# Patient Record
Sex: MALE | Race: WHITE
[De-identification: names, ages, dates, MRNs, and addresses within clinical notes are randomized per-mention and may not be internally consistent; named-entity substitution may affect disease eponyms.]

---

## 2019-06-04 ENCOUNTER — HOSPITAL ENCOUNTER (OUTPATIENT)
Dept: HOSPITAL 92 - SDC | Age: 64
Discharge: HOME | End: 2019-06-04
Attending: SURGERY
Payer: MEDICARE

## 2019-06-04 VITALS — BODY MASS INDEX: 20.9 KG/M2

## 2019-06-04 DIAGNOSIS — F17.210: ICD-10-CM

## 2019-06-04 DIAGNOSIS — I48.91: ICD-10-CM

## 2019-06-04 DIAGNOSIS — Z79.82: ICD-10-CM

## 2019-06-04 DIAGNOSIS — I12.0: Primary | ICD-10-CM

## 2019-06-04 DIAGNOSIS — T82.41XA: ICD-10-CM

## 2019-06-04 DIAGNOSIS — Z79.01: ICD-10-CM

## 2019-06-04 DIAGNOSIS — Z79.899: ICD-10-CM

## 2019-06-04 DIAGNOSIS — N18.6: ICD-10-CM

## 2019-06-04 DIAGNOSIS — J44.9: ICD-10-CM

## 2019-06-04 DIAGNOSIS — I69.354: ICD-10-CM

## 2019-06-04 LAB
ANION GAP SERPL CALC-SCNC: 16 MMOL/L (ref 10–20)
BASOPHILS # BLD AUTO: 0 THOU/UL (ref 0–0.2)
BASOPHILS NFR BLD AUTO: 0.4 % (ref 0–1)
BUN SERPL-MCNC: 31 MG/DL (ref 8.4–25.7)
CALCIUM SERPL-MCNC: 9.7 MG/DL (ref 7.8–10.44)
CHLORIDE SERPL-SCNC: 101 MMOL/L (ref 98–107)
CO2 SERPL-SCNC: 27 MMOL/L (ref 23–31)
CREAT CL PREDICTED SERPL C-G-VRATE: 13 ML/MIN (ref 70–130)
EOSINOPHIL # BLD AUTO: 0.2 THOU/UL (ref 0–0.7)
EOSINOPHIL NFR BLD AUTO: 2.7 % (ref 0–10)
GLUCOSE SERPL-MCNC: 89 MG/DL (ref 80–115)
HGB BLD-MCNC: 10.8 G/DL (ref 14–18)
LYMPHOCYTES # BLD: 1.3 THOU/UL (ref 1.2–3.4)
LYMPHOCYTES NFR BLD AUTO: 18.7 % (ref 21–51)
MCH RBC QN AUTO: 33.8 PG (ref 27–31)
MCV RBC AUTO: 97.1 FL (ref 78–98)
MONOCYTES # BLD AUTO: 0.6 THOU/UL (ref 0.11–0.59)
MONOCYTES NFR BLD AUTO: 8.1 % (ref 0–10)
NEUTROPHILS # BLD AUTO: 4.9 THOU/UL (ref 1.4–6.5)
NEUTROPHILS NFR BLD AUTO: 70.1 % (ref 42–75)
PLATELET # BLD AUTO: 253 THOU/UL (ref 130–400)
POTASSIUM SERPL-SCNC: 4.6 MMOL/L (ref 3.5–5.1)
RBC # BLD AUTO: 3.18 MILL/UL (ref 4.7–6.1)
SODIUM SERPL-SCNC: 139 MMOL/L (ref 136–145)
WBC # BLD AUTO: 7 THOU/UL (ref 4.8–10.8)

## 2019-06-04 PROCEDURE — 031C0ZF BYPASS LEFT RADIAL ARTERY TO LOWER ARM VEIN, OPEN APPROACH: ICD-10-PCS | Performed by: SURGERY

## 2019-06-04 PROCEDURE — 0JH63XZ INSERTION OF TUNNELED VASCULAR ACCESS DEVICE INTO CHEST SUBCUTANEOUS TISSUE AND FASCIA, PERCUTANEOUS APPROACH: ICD-10-PCS | Performed by: SURGERY

## 2019-06-04 PROCEDURE — 71045 X-RAY EXAM CHEST 1 VIEW: CPT

## 2019-06-04 PROCEDURE — 0JPT3XZ REMOVAL OF TUNNELED VASCULAR ACCESS DEVICE FROM TRUNK SUBCUTANEOUS TISSUE AND FASCIA, PERCUTANEOUS APPROACH: ICD-10-PCS | Performed by: SURGERY

## 2019-06-04 PROCEDURE — 93005 ELECTROCARDIOGRAM TRACING: CPT

## 2019-06-04 PROCEDURE — 85025 COMPLETE CBC W/AUTO DIFF WBC: CPT

## 2019-06-04 PROCEDURE — C1769 GUIDE WIRE: HCPCS

## 2019-06-04 PROCEDURE — 80048 BASIC METABOLIC PNL TOTAL CA: CPT

## 2019-06-04 PROCEDURE — S0028 INJECTION, FAMOTIDINE, 20 MG: HCPCS

## 2019-06-04 PROCEDURE — 93010 ELECTROCARDIOGRAM REPORT: CPT

## 2019-06-04 PROCEDURE — C1752 CATH,HEMODIALYSIS,SHORT-TERM: HCPCS

## 2019-06-04 NOTE — RAD
AP view chest.



HISTORY: Hemodialysis catheter insertion.



AP view chest is obtained. There is a right jugular dialysis catheter distal tip overlying the SVC ri
ght atrial junction.



Mild cardiomegaly seen.



No evidence of effusions, pneumonia or pneumothorax seen.



IMPRESSION: mild cardiomegaly. No evidence of postprocedure hematoma or pneumothorax seen.



Reported By: Alexandro Jiménez 

Electronically Signed:  6/4/2019 4:22 PM

## 2019-06-04 NOTE — HP
HISTORY OF PRESENT ILLNESS:  Noel Morgan is a 64-year-old male patient, who had

a hemodialysis catheter placed at another facility in the past.  He has been

referred by Dr. Altamirano for permanent dialysis access.  He has seen Dr. Drake

who has performed a contrast venogram, suggesting the veins in his right arm are

superior, but on clinical exam, the left antecubital vein is superior.  The patient

has had a previous stroke.  He is nonambulatory, and presents today in the office.

He has a left hemiparesis.  He has completely paralyzed left upper extremity and

partially paralyzed left lower extremity.  He states he is able to transfer somewhat

with a brace on his lower extremities.  The patient dialyzes on Monday, Wednesday,

and Friday at [facility] on [facility] in [place] at 0900.  He is followed by Dr. Altamirano.  The patient lives with his daughter in [place].  The patient's daughter or

son-in-law takes him to dialysis and home. 



ALLERGIES:  NONE.



TOBACCO:  Less than a pack a day.



ALCOHOL:  None.



MEDICATIONS:  

1. Eliquis.

2. Atorvastatin.

3. Aspirin.

4. Baclofen.

5. Meclizine.

6. Metoprolol.

7. Protonix.

8. Vitamins.



ALLERGIES:  NONE.



PAST SURGICAL HISTORY:  Hemodialysis catheter.  He has never had a colonoscopy.  The

patient has worked in the past as a . 



REVIEW OF SYSTEMS:  Ten-point noncontributory otherwise.



PHYSICAL EXAMINATION:

VITAL SIGNS:  Weight 145 pounds, height 5 feet and 11 inches.  Blood pressure

132/84, pulse 81, temperature 99.3 degrees. 

GENERAL:  He is in a wheelchair.  His left upper extremity was paralyzed.  He has

weakness in his left lower extremity. 

LUNGS:  Clear to auscultation. 

CARDIAC:  Regular rate and rhythm without murmur or gallop. 

ABDOMEN:  Soft and nontender. 

EXTREMITIES:  As noted above.  Palpable radial pulses bilaterally.  Paralyzed left

upper extremity.  Antecubital vein right nonvisible.  Good size antecubital vein

left. 



ASSESSMENT AND PLAN:  

1. End-stage renal disease.  We will plan placement of a left arm fistula or graft.

He understands risks, benefits, and consents.  He will hold his Eliquis for 3 days

prior to the operation. 

2. Prior stroke in September 2018, left hemiparesis, nonambulatory.

3. Hypertension.

4. Chronic anticoagulation.

5. Chronic obstructive pulmonary disease.

6. Ongoing tobacco abuse.

7. History of atrial fibrillation, on anticoagulation.







Job ID:  [id number]

## 2019-06-04 NOTE — OP
DATE OF PROCEDURE:  06/04/2019



PREOPERATIVE DIAGNOSES:  

1. End-stage renal disease.

2. Dysfunctional right internal jugular hemodialysis catheter.

3. Previous stroke with left hemiparesis.



POSTOPERATIVE DIAGNOSES:  

1. End-stage renal disease.

2. Dysfunctional right internal jugular hemodialysis catheter.

3. Previous stroke with left hemiparesis.



PROCEDURE PERFORMED:  Removal of old hemodialysis catheter, placement of new

hemodialysis catheter, fluoroscopy used.  Left arm primary fistula, proximal radial

artery inflow, outflow, perforating branch antecubital vein without communication to

the basilic vein noted, retrograde antecubital vein preserved 3.5 mm coronary

dilators. 



ANESTHESIA:  General, local with 0.5% Marcaine with epinephrine 30 mL mixed with 2%

Xylocaine 10 mL. 



DESCRIPTION OF PROCEDURE:  The patient was taken to the operating room, where under

general anesthesia (severe COPD, previous stroke), neck and chest, left upper

extremity, axilla, chest prepared with ChloraPrep and draped in routine fashion.

Local anesthetic was infiltrated in the skin and subcutaneous tissue about the

operative site.  An incision was made over the right side of the neck and carried

down through the skin, subcutaneous tissue, platysma, and then hemodialysis catheter

dissected free, clamped, divided, J-wire threaded, dialysis catheter removed and new

exit site selected.  A stab incision was made over the right chest and using a

tunneling device, the pre-curved [employer] cuffed-tunneled hemodialysis tunneled

between the 2 incisions, placed the fabric cuff beneath the skin exit site and

catheter was secured with 2 interrupted sutures of 3-0 nylon.  Sterile dressing

applied.  Over the J-wire, dilator and Peel-Away sheath placed in the superior vena

cava.  Dilator and J-wire were removed.  Catheter was placed with the Peel-Away

sheath.  Peel-Away sheath was removed.  Platysma was approximated with 4-0 Monocryl,

skin with subdermal 4-0 Monocryl and Derma glue applied.  Each port aspirated blood,

flushed with saline solution and heparinized saline solution 1000 units heparin per

mL indicating volume of the port.  Dermabond and sterile dressings were applied.

Fluoroscopic images revealed good catheter placement. 



Incision was made in the proximal volar forearm longitudinally below the antecubital

fossa carried down through the skin and subcutaneous tissue.  Antecubital vein was

dissected free.  Perforating branch dissected free.  The patient was given 6000

units of heparin intravenously.  Proximal radial artery dissected free.  Brachial

ulnar artery identified.  Perforating branch dissected free and branches were

divided between clips and 4-0 silk ties and spatulated over branch point,

interrogated with coronary artery dilators, passing coronary artery dilators from a

2 mm to 3.5 mm coronary dye at the cephalic vein outflow tract upper arm without

obstruction.  It was flushed with heparinized saline solution.  A bulldog clamp

atraumatic applied.  Proximal aorta was clamped proximally and distally.

Longitudinal arteriotomy was made sharply, elongated with Cooper scissors and the

perforating branch antecubital vein was spatulated and end vein to side proximal

radial anastomosis created with continuous suture of 6-0 Prolene, completing the

anastomosis, ligating branches with 4-0 silk ties.  We released an arterial inflow,

noting a good Doppler signal in the cephalic vein outflow.  Good hemostasis was

noted.  The patient was given 25 mg intravenously by Anesthesia.  Subcutaneous

tissue was approximated with 3-0 Monocryl, skin with subdermal 4-0 Monocryl, and

Derma glue applied.  The patient tolerated the procedure well. 







Job ID:  [id number]

## 2019-06-05 NOTE — EKG
Test Reason : PREOP

Blood Pressure : ***/*** mmHG

Vent. Rate : 072 BPM     Atrial Rate : 072 BPM

   P-R Int : 178 ms          QRS Dur : 092 ms

    QT Int : 410 ms       P-R-T Axes : 005 012 022 degrees

   QTc Int : 448 ms

 

Normal sinus rhythm

Normal ECG

No previous ECGs available

Confirmed by VINOD STORM, DR. CHAVEZ ([id number]) on 6/5/2019 8:24:24 PM

 

Referred By:  NESTOR           Confirmed By:DR. SCOTT BROCK MD

## 2019-08-13 ENCOUNTER — HOSPITAL ENCOUNTER (OUTPATIENT)
Dept: HOSPITAL 92 - SPEC | Age: 64
Discharge: HOME | End: 2019-08-13
Attending: SURGERY
Payer: MEDICARE

## 2019-08-13 VITALS — BODY MASS INDEX: 20.2 KG/M2

## 2019-08-13 DIAGNOSIS — I12.0: ICD-10-CM

## 2019-08-13 DIAGNOSIS — F41.9: ICD-10-CM

## 2019-08-13 DIAGNOSIS — T82.858A: Primary | ICD-10-CM

## 2019-08-13 DIAGNOSIS — Z79.899: ICD-10-CM

## 2019-08-13 DIAGNOSIS — Z79.01: ICD-10-CM

## 2019-08-13 DIAGNOSIS — Z79.82: ICD-10-CM

## 2019-08-13 DIAGNOSIS — K21.9: ICD-10-CM

## 2019-08-13 DIAGNOSIS — F17.200: ICD-10-CM

## 2019-08-13 DIAGNOSIS — J44.9: ICD-10-CM

## 2019-08-13 DIAGNOSIS — I69.352: ICD-10-CM

## 2019-08-13 DIAGNOSIS — E78.5: ICD-10-CM

## 2019-08-13 DIAGNOSIS — N18.6: ICD-10-CM

## 2019-08-13 DIAGNOSIS — Z99.2: ICD-10-CM

## 2019-08-13 PROCEDURE — 057F3Z1 DILATION OF LEFT CEPHALIC VEIN USING DRUG-COATED BALLOON, PERCUTANEOUS APPROACH: ICD-10-PCS | Performed by: RADIOLOGY

## 2019-08-13 PROCEDURE — C1725 CATH, TRANSLUMIN NON-LASER: HCPCS

## 2019-08-13 PROCEDURE — 36902 INTRO CATH DIALYSIS CIRCUIT: CPT

## 2019-08-13 PROCEDURE — 36901 INTRO CATH DIALYSIS CIRCUIT: CPT

## 2019-08-13 NOTE — SPC
EXAM:

[facility] [place] INTRO CATH DIALY CIRC/AV S



PROVIDED CLINICAL HISTORY:

Nonmaturing left upper extremity arteriovenous dialysis fistula in a patient with end-stage renal dis
ease.



COMPARISON:

None



TECHNIQUE:

The procedure including the risks and complications were explained to the patient, and informed conse
nt was obtained. Patient was placed on the angiography table in the supine position. The left upper

extremity was meticulously prepped and draped in usual sterile fashion.



Limited ultrasound examination of the left upper extremity arteriovenous fistulogram was performed, a
nd the fistula was accessed directed in the venous direction after the skin and subcutaneous

tissues were infiltrated with buffered 1% lidocaine for local anesthesia. A 4 Faroese introducer sheat
h was placed. A fistulogram and venogram to the SVC were performed. Manual compression was applied

to the venous outflow, and the arteriovenous anastomosis was refluxed.



Areas of narrowing were seen in the proximal venous inflow. As result, the arteriovenous dialysis fis
gely was accessed along the more proximal aspect of the left humerus directed in the arterial

direction again after the skin and subcutaneous tissues were infiltrated with buffered 1% lidocaine f
or local anesthesia. The 4 Faroese introducer sheath was placed, and contrast was injected to

confirm placement. The catheter was exchanged over a 0.035 inch Bentson guidewire for a 5 Faroese vasc
ular sheath. Manual compression was applied to the venous outflow, and contrast was injected to

evaluate the area of narrowing.



The cephalic vein outflow measured just less than 4 mm in greatest diameter. As result, a 4 mm angiop
lasty balloon was placed over the guidewire, and angioplasty was performed of the proximal venous

outflow in 2 separate areas. Patient demonstrated significant discomfort with inflation of the 4 mm a
ngioplasty balloon at these regions. However, the angioplasty balloon didn't achieve full profile,

and there was improved luminal diameter post angioplasty. Narrowing did persist in these regions alth
ough improved, but there was brisk flow and washout of contrast.



The vascular sheath were removed, and hemostasis was achieved with direct pressure at the puncture si
frantz. Patient tolerated the procedure well and without immediate consultation. Patient was

transported to radiology nurses holding for further monitoring prior to discharge.



IMPRESSION:



1. Patent arteriovenous anastomosis of the left upper extremity arteriovenous dialysis fistula.

2. Focal areas of narrowing in the proximal venous outflow with generalized small caliber of the ceph
alic vein outflow.

3. PTA of areas of narrowing in the proximal venous outflow. There is improved luminal diameter after
 angioplasty, but the areas of narrowing did persist. Follow-up angiogram in 4 weeks may be helpful

for further evaluation.



Reported By: Juan Manuel Christian 

Electronically Signed:  8/13/2019 12:28 PM

## 2019-08-15 NOTE — SPC
EXAM:

[facility] [place] INTRO CATH DIALY CIRC/AV S



PROVIDED CLINICAL HISTORY:

Nonmaturing left upper extremity arteriovenous dialysis fistula in a patient with end-stage renal dis
ease.



COMPARISON:

None



TECHNIQUE:

The procedure including the risks and complications were explained to the patient, and informed conse
nt was obtained. Patient was placed on the angiography table in the supine position. The left upper

extremity was meticulously prepped and draped in usual sterile fashion.



Limited ultrasound examination of the left upper extremity arteriovenous fistulogram was performed, a
nd the fistula was accessed directed in the venous direction after the skin and subcutaneous

tissues were infiltrated with buffered 1% lidocaine for local anesthesia. A 4 Kazakh introducer sheat
h was placed. A fistulogram and venogram to the SVC were performed. Manual compression was applied

to the venous outflow, and the arteriovenous anastomosis was refluxed.



Areas of narrowing were seen in the proximal venous inflow. As result, the arteriovenous dialysis fis
gely was accessed along the more proximal aspect of the left humerus directed in the arterial

direction again after the skin and subcutaneous tissues were infiltrated with buffered 1% lidocaine f
or local anesthesia. The 4 Kazakh introducer sheath was placed, and contrast was injected to

confirm placement. The catheter was exchanged over a 0.035 inch Bentson guidewire for a 5 Kazakh vasc
ular sheath. Manual compression was applied to the venous outflow, and contrast was injected to

evaluate the area of narrowing.



The cephalic vein outflow measured just less than 4 mm in greatest diameter. As result, a 4 mm angiop
lasty balloon was placed over the guidewire, and angioplasty was performed of the proximal venous

outflow in 2 separate areas. Patient demonstrated significant discomfort with inflation of the 4 mm a
ngioplasty balloon at these regions. However, the angioplasty balloon didn't achieve full profile,

and there was improved luminal diameter post angioplasty. Narrowing did persist in these regions alth
ough improved, but there was brisk flow and washout of contrast.



The vascular sheath were removed, and hemostasis was achieved with direct pressure at the puncture si
frantz. Patient tolerated the procedure well and without immediate consultation. Patient was

transported to radiology nurses holding for further monitoring prior to discharge.



IMPRESSION:



1. Patent arteriovenous anastomosis of the left upper extremity arteriovenous dialysis fistula.

2. Focal areas of narrowing in the proximal venous outflow with generalized small caliber of the ceph
alic vein outflow.

3. PTA of areas of narrowing in the proximal venous outflow. There is improved luminal diameter after
 angioplasty, but the areas of narrowing did persist. Follow-up arteriovenous fistulogram in 4

weeks may be helpful for further evaluation.



Transcribed Date/Time: 8/15/2019 1:21 PM



Reported By: Juan Manuel Christian 

Electronically Signed:  8/16/2019 12:58 PM

## 2023-09-25 ENCOUNTER — HOSPITAL ENCOUNTER (INPATIENT)
Dept: HOSPITAL 92 - ERS | Age: 68
LOS: 16 days | Discharge: NURSING, REHAB OR LONG-TERM CARE | DRG: 264 | End: 2023-10-11
Attending: INTERNAL MEDICINE | Admitting: HOSPITALIST
Payer: MEDICARE

## 2023-09-25 VITALS — BODY MASS INDEX: 16.6 KG/M2

## 2023-09-25 DIAGNOSIS — Z79.899: ICD-10-CM

## 2023-09-25 DIAGNOSIS — T82.868A: ICD-10-CM

## 2023-09-25 DIAGNOSIS — D63.1: ICD-10-CM

## 2023-09-25 DIAGNOSIS — E87.20: ICD-10-CM

## 2023-09-25 DIAGNOSIS — E44.0: ICD-10-CM

## 2023-09-25 DIAGNOSIS — E86.0: ICD-10-CM

## 2023-09-25 DIAGNOSIS — Z91.158: ICD-10-CM

## 2023-09-25 DIAGNOSIS — L98.419: ICD-10-CM

## 2023-09-25 DIAGNOSIS — E88.09: ICD-10-CM

## 2023-09-25 DIAGNOSIS — E78.5: ICD-10-CM

## 2023-09-25 DIAGNOSIS — N28.1: ICD-10-CM

## 2023-09-25 DIAGNOSIS — L02.31: ICD-10-CM

## 2023-09-25 DIAGNOSIS — L02.212: ICD-10-CM

## 2023-09-25 DIAGNOSIS — I47.29: ICD-10-CM

## 2023-09-25 DIAGNOSIS — I13.2: Primary | ICD-10-CM

## 2023-09-25 DIAGNOSIS — D62: ICD-10-CM

## 2023-09-25 DIAGNOSIS — Z79.82: ICD-10-CM

## 2023-09-25 DIAGNOSIS — Z98.890: ICD-10-CM

## 2023-09-25 DIAGNOSIS — I73.9: ICD-10-CM

## 2023-09-25 DIAGNOSIS — F17.210: ICD-10-CM

## 2023-09-25 DIAGNOSIS — I50.22: ICD-10-CM

## 2023-09-25 DIAGNOSIS — L02.415: ICD-10-CM

## 2023-09-25 DIAGNOSIS — R78.81: ICD-10-CM

## 2023-09-25 DIAGNOSIS — N17.9: ICD-10-CM

## 2023-09-25 DIAGNOSIS — A04.72: ICD-10-CM

## 2023-09-25 DIAGNOSIS — I72.2: ICD-10-CM

## 2023-09-25 DIAGNOSIS — Z66: ICD-10-CM

## 2023-09-25 DIAGNOSIS — E87.5: ICD-10-CM

## 2023-09-25 DIAGNOSIS — Z86.718: ICD-10-CM

## 2023-09-25 DIAGNOSIS — N18.6: ICD-10-CM

## 2023-09-25 DIAGNOSIS — L72.3: ICD-10-CM

## 2023-09-25 DIAGNOSIS — Z79.01: ICD-10-CM
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
## 2023-09-25 DIAGNOSIS — I08.0: ICD-10-CM

## 2023-09-25 DIAGNOSIS — I69.354: ICD-10-CM

## 2023-09-25 LAB
ALBUMIN SERPL BCG-MCNC: 3.4 G/DL (ref 3.4–4.8)
ALP SERPL-CCNC: 58 U/L (ref 40–110)
ALT SERPL W P-5'-P-CCNC: (no result) U/L (ref 8–55)
ANALYZER IN CARDIO: (no result)
ANALYZER IN CARDIO: (no result)
ANION GAP SERPL CALC-SCNC: (no result) MMOL/L (ref 10–20)
ANION GAP SERPL CALC-SCNC: 29 MMOL/L (ref 10–20)
AST SERPL-CCNC: 6 U/L (ref 5–34)
BASE EXCESS STD BLDV CALC-SCNC: -18 MEQ/L
BASE EXCESS STD BLDV CALC-SCNC: -19.1 MEQ/L
BASOPHILS # BLD AUTO: 0 THOU/UL (ref 0–0.2)
BASOPHILS # BLD AUTO: 0 THOU/UL (ref 0–0.2)
BASOPHILS NFR BLD AUTO: 0.1 % (ref 0–1)
BASOPHILS NFR BLD AUTO: 0.1 % (ref 0–1)
BILIRUB SERPL-MCNC: 0.4 MG/DL (ref 0.2–1.2)
BUN SERPL-MCNC: 171 MG/DL (ref 8.4–25.7)
BUN SERPL-MCNC: 173 MG/DL (ref 8.4–25.7)
CA-I BLDV-MCNC: 0.84 MMOL/L (ref 1.16–1.32)
CA-I BLDV-MCNC: 0.89 MMOL/L (ref 1.16–1.32)
CALCIUM SERPL-MCNC: 7.7 MG/DL (ref 7.8–10.44)
CALCIUM SERPL-MCNC: 7.7 MG/DL (ref 7.8–10.44)
CHLORIDE BLDV-SCNC: 109 MMOL/L (ref 98–106)
CHLORIDE BLDV-SCNC: 109 MMOL/L (ref 98–106)
CHLORIDE SERPL-SCNC: 104 MMOL/L (ref 98–107)
CHLORIDE SERPL-SCNC: 105 MMOL/L (ref 98–107)
CK SERPL-CCNC: 135 U/L (ref 30–200)
CO2 SERPL-SCNC: (no result) MMOL/L (ref 23–31)
CO2 SERPL-SCNC: 8 MMOL/L (ref 23–31)
CREAT CL PREDICTED SERPL C-G-VRATE: 0 ML/MIN (ref 70–130)
CREAT CL PREDICTED SERPL C-G-VRATE: 0 ML/MIN (ref 70–130)
EOSINOPHIL # BLD AUTO: 0 THOU/UL (ref 0–0.7)
EOSINOPHIL # BLD AUTO: 0.1 THOU/UL (ref 0–0.7)
EOSINOPHIL NFR BLD AUTO: 0.2 % (ref 0–10)
EOSINOPHIL NFR BLD AUTO: 0.6 % (ref 0–10)
GLOBULIN SER CALC-MCNC: 3.6 G/DL (ref 2.4–3.5)
GLUCOSE SERPL-MCNC: 102 MG/DL (ref 80–115)
GLUCOSE SERPL-MCNC: 106 MG/DL (ref 80–115)
HCO3 BLDV-SCNC: 7.5 MEQ/L (ref 22–28)
HCO3 BLDV-SCNC: 8.2 MEQ/L (ref 22–28)
HCT VFR BLD CALC: 16.8 % (ref 42–52)
HCT VFR BLD CALC: 24.4 % (ref 42–52)
HCT VFR BLDV CALC: 19 % (ref 42–52)
HCT VFR BLDV CALC: 26 % (ref 42–52)
HGB BLD-MCNC: 5.2 G/DL (ref 14–18)
HGB BLD-MCNC: 7.8 G/DL (ref 14–18)
HGB BLDV-MCNC: 6.3 G/DL (ref 12.6–17.4)
HGB BLDV-MCNC: 8.8 G/DL (ref 12.6–17.4)
LIPASE SERPL-CCNC: 122 U/L (ref 8–78)
LYMPHOCYTES NFR BLD AUTO: 2.5 % (ref 21–51)
LYMPHOCYTES NFR BLD AUTO: 2.9 % (ref 21–51)
MCH RBC QN AUTO: 28.9 PG (ref 27–31)
MCH RBC QN AUTO: 30.6 PG (ref 27–31)
MCV RBC AUTO: 93.3 FL (ref 78–98)
MCV RBC AUTO: 95.7 FL (ref 78–98)
MONOCYTES # BLD AUTO: 0.7 THOU/UL (ref 0.11–0.59)
MONOCYTES # BLD AUTO: 0.9 THOU/UL (ref 0.11–0.59)
MONOCYTES NFR BLD AUTO: 5.1 % (ref 0–10)
MONOCYTES NFR BLD AUTO: 5.8 % (ref 0–10)
NEUTROPHILS # BLD AUTO: 12.9 THOU/UL (ref 1.4–6.5)
NEUTROPHILS # BLD AUTO: 13.8 THOU/UL (ref 1.4–6.5)
NEUTROPHILS NFR BLD AUTO: 89.7 % (ref 42–75)
NEUTROPHILS NFR BLD AUTO: 91.4 % (ref 42–75)
PLATELET # BLD AUTO: 233 10X3/UL (ref 130–400)
PLATELET # BLD AUTO: 293 10X3/UL (ref 130–400)
POTASSIUM BLDV-SCNC: 4.7 MMOL/L (ref 3.7–5.3)
POTASSIUM BLDV-SCNC: 4.86 MMOL/L (ref 3.7–5.3)
POTASSIUM SERPL-SCNC: 5 MMOL/L (ref 3.5–5.1)
POTASSIUM SERPL-SCNC: 5.2 MMOL/L (ref 3.5–5.1)
RBC # BLD AUTO: 1.8 MILL/UL (ref 4.7–6.1)
RBC # BLD AUTO: 2.55 MILL/UL (ref 4.7–6.1)
SODIUM BLDV-SCNC: 136 MMOL/L (ref 133–146)
SODIUM BLDV-SCNC: 137.3 MMOL/L (ref 133–146)
SODIUM SERPL-SCNC: 136 MMOL/L (ref 136–145)
SODIUM SERPL-SCNC: 138 MMOL/L (ref 136–145)
TROPONIN I SERPL DL<=0.01 NG/ML-MCNC: 0.13 NG/ML (ref ?–0.03)
WBC # BLD AUTO: 14.1 10X3/UL (ref 4.8–10.8)
WBC # BLD AUTO: 15.4 10X3/UL (ref 4.8–10.8)

## 2023-09-25 PROCEDURE — 88313 SPECIAL STAINS GROUP 2: CPT

## 2023-09-25 PROCEDURE — 87324 CLOSTRIDIUM AG IA: CPT

## 2023-09-25 PROCEDURE — 87449 NOS EACH ORGANISM AG IA: CPT

## 2023-09-25 PROCEDURE — 86850 RBC ANTIBODY SCREEN: CPT

## 2023-09-25 PROCEDURE — 80202 ASSAY OF VANCOMYCIN: CPT

## 2023-09-25 PROCEDURE — 84484 ASSAY OF TROPONIN QUANT: CPT

## 2023-09-25 PROCEDURE — 83605 ASSAY OF LACTIC ACID: CPT

## 2023-09-25 PROCEDURE — 83630 LACTOFERRIN FECAL (QUAL): CPT

## 2023-09-25 PROCEDURE — C1752 CATH,HEMODIALYSIS,SHORT-TERM: HCPCS

## 2023-09-25 PROCEDURE — 82805 BLOOD GASES W/O2 SATURATION: CPT

## 2023-09-25 PROCEDURE — 74176 CT ABD & PELVIS W/O CONTRAST: CPT

## 2023-09-25 PROCEDURE — 36430 TRANSFUSION BLD/BLD COMPNT: CPT

## 2023-09-25 PROCEDURE — 85025 COMPLETE CBC W/AUTO DIFF WBC: CPT

## 2023-09-25 PROCEDURE — C1725 CATH, TRANSLUMIN NON-LASER: HCPCS

## 2023-09-25 PROCEDURE — 87077 CULTURE AEROBIC IDENTIFY: CPT

## 2023-09-25 PROCEDURE — 87493 C DIFF AMPLIFIED PROBE: CPT

## 2023-09-25 PROCEDURE — 71045 X-RAY EXAM CHEST 1 VIEW: CPT

## 2023-09-25 PROCEDURE — 87186 SC STD MICRODIL/AGAR DIL: CPT

## 2023-09-25 PROCEDURE — 86704 HEP B CORE ANTIBODY TOTAL: CPT

## 2023-09-25 PROCEDURE — 86901 BLOOD TYPING SEROLOGIC RH(D): CPT

## 2023-09-25 PROCEDURE — 93005 ELECTROCARDIOGRAM TRACING: CPT

## 2023-09-25 PROCEDURE — 82550 ASSAY OF CK (CPK): CPT

## 2023-09-25 PROCEDURE — 87070 CULTURE OTHR SPECIMN AEROBIC: CPT

## 2023-09-25 PROCEDURE — 80048 BASIC METABOLIC PNL TOTAL CA: CPT

## 2023-09-25 PROCEDURE — 97139 UNLISTED THERAPEUTIC PX: CPT

## 2023-09-25 PROCEDURE — 82010 KETONE BODYS QUAN: CPT

## 2023-09-25 PROCEDURE — 90935 HEMODIALYSIS ONE EVALUATION: CPT

## 2023-09-25 PROCEDURE — 83690 ASSAY OF LIPASE: CPT

## 2023-09-25 PROCEDURE — 83880 ASSAY OF NATRIURETIC PEPTIDE: CPT

## 2023-09-25 PROCEDURE — 36416 COLLJ CAPILLARY BLOOD SPEC: CPT

## 2023-09-25 PROCEDURE — C1751 CATH, INF, PER/CENT/MIDLINE: HCPCS

## 2023-09-25 PROCEDURE — P9016 RBC LEUKOCYTES REDUCED: HCPCS

## 2023-09-25 PROCEDURE — 82607 VITAMIN B-12: CPT

## 2023-09-25 PROCEDURE — 85730 THROMBOPLASTIN TIME PARTIAL: CPT

## 2023-09-25 PROCEDURE — 82274 ASSAY TEST FOR BLOOD FECAL: CPT

## 2023-09-25 PROCEDURE — C9113 INJ PANTOPRAZOLE SODIUM, VIA: HCPCS

## 2023-09-25 PROCEDURE — 87205 SMEAR GRAM STAIN: CPT

## 2023-09-25 PROCEDURE — 85610 PROTHROMBIN TIME: CPT

## 2023-09-25 PROCEDURE — 86580 TB INTRADERMAL TEST: CPT

## 2023-09-25 PROCEDURE — 88305 TISSUE EXAM BY PATHOLOGIST: CPT

## 2023-09-25 PROCEDURE — 30233N1 TRANSFUSION OF NONAUTOLOGOUS RED BLOOD CELLS INTO PERIPHERAL VEIN, PERCUTANEOUS APPROACH: ICD-10-PCS | Performed by: HOSPITALIST

## 2023-09-25 PROCEDURE — G0257 UNSCHED DIALYSIS ESRD PT HOS: HCPCS

## 2023-09-25 PROCEDURE — 83540 ASSAY OF IRON: CPT

## 2023-09-25 PROCEDURE — 83550 IRON BINDING TEST: CPT

## 2023-09-25 PROCEDURE — 93306 TTE W/DOPPLER COMPLETE: CPT

## 2023-09-25 PROCEDURE — 86900 BLOOD TYPING SEROLOGIC ABO: CPT

## 2023-09-25 PROCEDURE — 83735 ASSAY OF MAGNESIUM: CPT

## 2023-09-25 PROCEDURE — S0020 INJECTION, BUPIVICAINE HYDRO: HCPCS

## 2023-09-25 PROCEDURE — 84443 ASSAY THYROID STIM HORMONE: CPT

## 2023-09-25 PROCEDURE — 36901 INTRO CATH DIALYSIS CIRCUIT: CPT

## 2023-09-25 PROCEDURE — 36415 COLL VENOUS BLD VENIPUNCTURE: CPT

## 2023-09-26 LAB
ANION GAP SERPL CALC-SCNC: (no result) MMOL/L (ref 10–20)
APTT PPP: 24.5 SEC (ref 22.9–36.1)
BASOPHILS # BLD AUTO: 0 THOU/UL (ref 0–0.2)
BASOPHILS NFR BLD AUTO: 0.1 % (ref 0–1)
BUN SERPL-MCNC: 184 MG/DL (ref 8.4–25.7)
CALCIUM SERPL-MCNC: 7.1 MG/DL (ref 7.8–10.44)
CHLORIDE SERPL-SCNC: 106 MMOL/L (ref 98–107)
CO2 SERPL-SCNC: (no result) MMOL/L (ref 23–31)
CREAT CL PREDICTED SERPL C-G-VRATE: 2 ML/MIN (ref 70–130)
EOSINOPHIL # BLD AUTO: 0.1 THOU/UL (ref 0–0.7)
EOSINOPHIL NFR BLD AUTO: 0.9 % (ref 0–10)
GLUCOSE SERPL-MCNC: 102 MG/DL (ref 80–115)
HBSAG INDEX: 0.21 S/CO (ref 0–0.99)
HBV SURFACE AB SERPL IA-ACNC: 243.38 MIU/ML
HCT VFR BLD CALC: 22.9 % (ref 42–52)
HEP B CORE TOTAL INDEX: 0.08 S/CO (ref 0–0.79)
HEP C INDEX: 0.08 S/CO (ref 0–0.79)
HGB BLD-MCNC: 7.1 G/DL (ref 14–18)
INR PPP: 1.2
IRON SERPL-MCNC: 138 UG/DL (ref 65–175)
IRON SERPL-MCNC: 139 UG/DL (ref 65–175)
LYMPHOCYTES NFR BLD AUTO: 2.5 % (ref 21–51)
MCH RBC QN AUTO: 30.3 PG (ref 27–31)
MCV RBC AUTO: 97.9 FL (ref 78–98)
MONOCYTES # BLD AUTO: 0.9 THOU/UL (ref 0.11–0.59)
MONOCYTES NFR BLD AUTO: 6.1 % (ref 0–10)
NEUTROPHILS # BLD AUTO: 13.4 THOU/UL (ref 1.4–6.5)
NEUTROPHILS NFR BLD AUTO: 89.5 % (ref 42–75)
PLATELET # BLD AUTO: 229 10X3/UL (ref 130–400)
POTASSIUM SERPL-SCNC: 4.7 MMOL/L (ref 3.5–5.1)
PROTHROMBIN TIME: 15.6 SEC (ref 12–14.7)
RBC # BLD AUTO: 2.34 MILL/UL (ref 4.7–6.1)
SODIUM SERPL-SCNC: 139 MMOL/L (ref 136–145)
TSH SERPL DL<=0.005 MIU/L-ACNC: 3.29 UIU/ML (ref 0.35–4.94)
UIBC SERPL-MCNC: 140 MCG/DL (ref 261–462)
VIT B12 SERPL-MCNC: 755 PG/ML (ref 211–911)
WBC # BLD AUTO: 15 10X3/UL (ref 4.8–10.8)

## 2023-09-26 PROCEDURE — 06HY33Z INSERTION OF INFUSION DEVICE INTO LOWER VEIN, PERCUTANEOUS APPROACH: ICD-10-PCS | Performed by: SURGERY

## 2023-09-26 PROCEDURE — 5A1D70Z PERFORMANCE OF URINARY FILTRATION, INTERMITTENT, LESS THAN 6 HOURS PER DAY: ICD-10-PCS | Performed by: SURGERY

## 2023-09-27 LAB
ANION GAP SERPL CALC-SCNC: 22 MMOL/L (ref 10–20)
ANION GAP SERPL CALC-SCNC: 25 MMOL/L (ref 10–20)
BASOPHILS # BLD AUTO: 0 THOU/UL (ref 0–0.2)
BASOPHILS # BLD AUTO: 0 THOU/UL (ref 0–0.2)
BASOPHILS NFR BLD AUTO: 0.1 % (ref 0–1)
BASOPHILS NFR BLD AUTO: 0.2 % (ref 0–1)
BUN SERPL-MCNC: 66 MG/DL (ref 8.4–25.7)
BUN SERPL-MCNC: 73 MG/DL (ref 8.4–25.7)
CALCIUM SERPL-MCNC: 7 MG/DL (ref 7.8–10.44)
CALCIUM SERPL-MCNC: 7.4 MG/DL (ref 7.8–10.44)
CHLORIDE SERPL-SCNC: 99 MMOL/L (ref 98–107)
CHLORIDE SERPL-SCNC: 99 MMOL/L (ref 98–107)
CO2 SERPL-SCNC: 18 MMOL/L (ref 23–31)
CO2 SERPL-SCNC: 21 MMOL/L (ref 23–31)
CREAT CL PREDICTED SERPL C-G-VRATE: 5 ML/MIN (ref 70–130)
CREAT CL PREDICTED SERPL C-G-VRATE: 5 ML/MIN (ref 70–130)
EOSINOPHIL # BLD AUTO: 0 THOU/UL (ref 0–0.7)
EOSINOPHIL # BLD AUTO: 0 THOU/UL (ref 0–0.7)
EOSINOPHIL NFR BLD AUTO: 0 % (ref 0–10)
EOSINOPHIL NFR BLD AUTO: 0.1 % (ref 0–10)
GLUCOSE SERPL-MCNC: 85 MG/DL (ref 80–115)
GLUCOSE SERPL-MCNC: 91 MG/DL (ref 80–115)
HCT VFR BLD CALC: 22 % (ref 42–52)
HCT VFR BLD CALC: 22.1 % (ref 42–52)
HGB BLD-MCNC: 7.5 G/DL (ref 14–18)
HGB BLD-MCNC: 7.5 G/DL (ref 14–18)
LYMPHOCYTES NFR BLD AUTO: 1.3 % (ref 21–51)
LYMPHOCYTES NFR BLD AUTO: 1.6 % (ref 21–51)
MCH RBC QN AUTO: 30.4 PG (ref 27–31)
MCH RBC QN AUTO: 30.7 PG (ref 27–31)
MCV RBC AUTO: 89.1 FL (ref 78–98)
MCV RBC AUTO: 90.6 FL (ref 78–98)
MONOCYTES # BLD AUTO: 0.7 THOU/UL (ref 0.11–0.59)
MONOCYTES # BLD AUTO: 1.1 THOU/UL (ref 0.11–0.59)
MONOCYTES NFR BLD AUTO: 3.6 % (ref 0–10)
MONOCYTES NFR BLD AUTO: 5.4 % (ref 0–10)
NEUTROPHILS # BLD AUTO: 18.1 THOU/UL (ref 1.4–6.5)
NEUTROPHILS # BLD AUTO: 18.3 THOU/UL (ref 1.4–6.5)
NEUTROPHILS NFR BLD AUTO: 92.2 % (ref 42–75)
NEUTROPHILS NFR BLD AUTO: 94.4 % (ref 42–75)
PLATELET # BLD AUTO: 216 10X3/UL (ref 130–400)
PLATELET # BLD AUTO: 229 10X3/UL (ref 130–400)
POTASSIUM SERPL-SCNC: 3.3 MMOL/L (ref 3.5–5.1)
POTASSIUM SERPL-SCNC: 3.9 MMOL/L (ref 3.5–5.1)
RBC # BLD AUTO: 2.44 MILL/UL (ref 4.7–6.1)
RBC # BLD AUTO: 2.47 MILL/UL (ref 4.7–6.1)
SODIUM SERPL-SCNC: 138 MMOL/L (ref 136–145)
SODIUM SERPL-SCNC: 139 MMOL/L (ref 136–145)
WBC # BLD AUTO: 19.4 10X3/UL (ref 4.8–10.8)
WBC # BLD AUTO: 19.6 10X3/UL (ref 4.8–10.8)

## 2023-09-27 RX ADMIN — ERYTHROPOIETIN SCH UNITS: 10000 INJECTION, SOLUTION INTRAVENOUS; SUBCUTANEOUS at 14:49

## 2023-09-27 RX ADMIN — ONDANSETRON PRN MG: 2 INJECTION INTRAMUSCULAR; INTRAVENOUS at 11:51

## 2023-09-28 LAB
ANION GAP SERPL CALC-SCNC: 19 MMOL/L (ref 10–20)
BUN SERPL-MCNC: 30 MG/DL (ref 8.4–25.7)
CALCIUM SERPL-MCNC: 7.9 MG/DL (ref 7.8–10.44)
CHLORIDE SERPL-SCNC: 100 MMOL/L (ref 98–107)
CO2 SERPL-SCNC: 23 MMOL/L (ref 23–31)
CREAT CL PREDICTED SERPL C-G-VRATE: 9 ML/MIN (ref 70–130)
DELETE AUTO DIFF??: YES
GLUCOSE SERPL-MCNC: 80 MG/DL (ref 80–115)
HCT VFR BLD CALC: 24.1 % (ref 42–52)
HGB BLD-MCNC: 7.9 G/DL (ref 14–18)
MANUAL DIFF??: YES
MCH RBC QN AUTO: 31 PG (ref 27–31)
MCV RBC AUTO: 94.5 FL (ref 78–98)
PLATELET # BLD AUTO: 205 10X3/UL (ref 130–400)
POLYCHROMASIA BLD QL SMEAR: (no result) (100X)
POTASSIUM SERPL-SCNC: 3.7 MMOL/L (ref 3.5–5.1)
RBC # BLD AUTO: 2.55 MILL/UL (ref 4.7–6.1)
SODIUM SERPL-SCNC: 138 MMOL/L (ref 136–145)
VANCOMYCIN SERPL-MCNC: (no result) UG/ML
WBC # BLD AUTO: 21.4 10X3/UL (ref 4.8–10.8)

## 2023-09-28 RX ADMIN — ONDANSETRON PRN MG: 2 INJECTION INTRAMUSCULAR; INTRAVENOUS at 11:33

## 2023-09-29 LAB
ANION GAP SERPL CALC-SCNC: 18 MMOL/L (ref 10–20)
BASOPHILS # BLD AUTO: 0 THOU/UL (ref 0–0.2)
BASOPHILS # BLD AUTO: 0 THOU/UL (ref 0–0.2)
BASOPHILS NFR BLD AUTO: 0.1 % (ref 0–1)
BASOPHILS NFR BLD AUTO: 0.2 % (ref 0–1)
BUN SERPL-MCNC: 38 MG/DL (ref 8.4–25.7)
CALCIUM SERPL-MCNC: 7.8 MG/DL (ref 7.8–10.44)
CHLORIDE SERPL-SCNC: 98 MMOL/L (ref 98–107)
CO2 SERPL-SCNC: 22 MMOL/L (ref 23–31)
CREAT CL PREDICTED SERPL C-G-VRATE: 8 ML/MIN (ref 70–130)
EOSINOPHIL # BLD AUTO: 0.1 THOU/UL (ref 0–0.7)
EOSINOPHIL # BLD AUTO: 0.1 THOU/UL (ref 0–0.7)
EOSINOPHIL NFR BLD AUTO: 0.4 % (ref 0–10)
EOSINOPHIL NFR BLD AUTO: 0.4 % (ref 0–10)
GLUCOSE SERPL-MCNC: 117 MG/DL (ref 80–115)
HCT VFR BLD CALC: 19.4 % (ref 42–52)
HCT VFR BLD CALC: 22.7 % (ref 42–52)
HCT VFR BLD CALC: 23.8 % (ref 42–52)
HCT VFR BLD CALC: 27.9 % (ref 42–52)
HGB BLD-MCNC: 6.1 G/DL (ref 14–18)
HGB BLD-MCNC: 7.6 G/DL (ref 14–18)
HGB BLD-MCNC: 7.6 G/DL (ref 14–18)
HGB BLD-MCNC: 9.3 G/DL (ref 14–18)
INR PPP: 1.2
LYMPHOCYTES NFR BLD AUTO: 2.4 % (ref 21–51)
LYMPHOCYTES NFR BLD AUTO: 2.4 % (ref 21–51)
MCH RBC QN AUTO: 30.5 PG (ref 27–31)
MCH RBC QN AUTO: 30.5 PG (ref 27–31)
MCV RBC AUTO: 95.6 FL (ref 78–98)
MCV RBC AUTO: 97 FL (ref 78–98)
MONOCYTES # BLD AUTO: 0.7 THOU/UL (ref 0.11–0.59)
MONOCYTES # BLD AUTO: 1.9 THOU/UL (ref 0.11–0.59)
MONOCYTES NFR BLD AUTO: 4.6 % (ref 0–10)
MONOCYTES NFR BLD AUTO: 8 % (ref 0–10)
NEUTROPHILS # BLD AUTO: 13.2 THOU/UL (ref 1.4–6.5)
NEUTROPHILS # BLD AUTO: 21.3 THOU/UL (ref 1.4–6.5)
NEUTROPHILS NFR BLD AUTO: 88.3 % (ref 42–75)
NEUTROPHILS NFR BLD AUTO: 91.9 % (ref 42–75)
PLATELET # BLD AUTO: 120 10X3/UL (ref 130–400)
PLATELET # BLD AUTO: 138 10X3/UL (ref 130–400)
PLATELET # BLD AUTO: 155 10X3/UL (ref 130–400)
PLATELET # BLD AUTO: 192 10X3/UL (ref 130–400)
POTASSIUM SERPL-SCNC: 3.2 MMOL/L (ref 3.5–5.1)
PROTHROMBIN TIME: 15.8 SEC (ref 12–14.7)
RBC # BLD AUTO: 2 MILL/UL (ref 4.7–6.1)
RBC # BLD AUTO: 2.49 MILL/UL (ref 4.7–6.1)
SODIUM SERPL-SCNC: 135 MMOL/L (ref 136–145)
VANCOMYCIN SERPL-MCNC: 17.6 UG/ML
WBC # BLD AUTO: 14.4 10X3/UL (ref 4.8–10.8)
WBC # BLD AUTO: 24.1 10X3/UL (ref 4.8–10.8)

## 2023-09-29 PROCEDURE — 5A1D70Z PERFORMANCE OF URINARY FILTRATION, INTERMITTENT, LESS THAN 6 HOURS PER DAY: ICD-10-PCS | Performed by: SURGERY

## 2023-09-29 PROCEDURE — 0JH63XZ INSERTION OF TUNNELED VASCULAR ACCESS DEVICE INTO CHEST SUBCUTANEOUS TISSUE AND FASCIA, PERCUTANEOUS APPROACH: ICD-10-PCS | Performed by: SURGERY

## 2023-09-29 PROCEDURE — 0J9L0ZZ DRAINAGE OF RIGHT UPPER LEG SUBCUTANEOUS TISSUE AND FASCIA, OPEN APPROACH: ICD-10-PCS | Performed by: SURGERY

## 2023-09-29 PROCEDURE — 02HV33Z INSERTION OF INFUSION DEVICE INTO SUPERIOR VENA CAVA, PERCUTANEOUS APPROACH: ICD-10-PCS | Performed by: SURGERY

## 2023-09-29 PROCEDURE — 0J990ZZ DRAINAGE OF BUTTOCK SUBCUTANEOUS TISSUE AND FASCIA, OPEN APPROACH: ICD-10-PCS | Performed by: SURGERY

## 2023-09-29 PROCEDURE — B5181ZA FLUOROSCOPY OF SUPERIOR VENA CAVA USING LOW OSMOLAR CONTRAST, GUIDANCE: ICD-10-PCS | Performed by: SURGERY

## 2023-09-29 PROCEDURE — B548ZZA ULTRASONOGRAPHY OF SUPERIOR VENA CAVA, GUIDANCE: ICD-10-PCS | Performed by: SURGERY

## 2023-09-29 RX ADMIN — HYDROCODONE BITARTRATE AND ACETAMINOPHEN PRN TAB: 5; 325 TABLET ORAL at 18:01

## 2023-09-29 RX ADMIN — ERYTHROPOIETIN SCH UNITS: 10000 INJECTION, SOLUTION INTRAVENOUS; SUBCUTANEOUS at 15:56

## 2023-09-29 RX ADMIN — POTASSIUM CHLORIDE SCH MLS: 14.9 INJECTION, SOLUTION INTRAVENOUS at 12:28

## 2023-09-29 RX ADMIN — POTASSIUM CHLORIDE SCH MLS: 14.9 INJECTION, SOLUTION INTRAVENOUS at 13:28

## 2023-09-30 LAB
ANION GAP SERPL CALC-SCNC: 14 MMOL/L (ref 10–20)
BASOPHILS # BLD AUTO: 0 THOU/UL (ref 0–0.2)
BASOPHILS NFR BLD AUTO: 0.2 % (ref 0–1)
BUN SERPL-MCNC: 30 MG/DL (ref 8.4–25.7)
CALCIUM SERPL-MCNC: 7.5 MG/DL (ref 7.8–10.44)
CHLORIDE SERPL-SCNC: 101 MMOL/L (ref 98–107)
CO2 SERPL-SCNC: 26 MMOL/L (ref 23–31)
CREAT CL PREDICTED SERPL C-G-VRATE: 10 ML/MIN (ref 70–130)
EOSINOPHIL # BLD AUTO: 0.1 THOU/UL (ref 0–0.7)
EOSINOPHIL NFR BLD AUTO: 0.7 % (ref 0–10)
GLUCOSE SERPL-MCNC: 106 MG/DL (ref 80–115)
HCT VFR BLD CALC: 23.3 % (ref 42–52)
HCT VFR BLD CALC: 23.5 % (ref 42–52)
HGB BLD-MCNC: 7.4 G/DL (ref 14–18)
HGB BLD-MCNC: 7.5 G/DL (ref 14–18)
LYMPHOCYTES NFR BLD AUTO: 3.4 % (ref 21–51)
MCH RBC QN AUTO: 30.6 PG (ref 27–31)
MCV RBC AUTO: 95.1 FL (ref 78–98)
MONOCYTES # BLD AUTO: 1.6 THOU/UL (ref 0.11–0.59)
MONOCYTES NFR BLD AUTO: 8.2 % (ref 0–10)
NEUTROPHILS # BLD AUTO: 16.7 THOU/UL (ref 1.4–6.5)
NEUTROPHILS NFR BLD AUTO: 86.8 % (ref 42–75)
PLATELET # BLD AUTO: 125 10X3/UL (ref 130–400)
PLATELET # BLD AUTO: 129 10X3/UL (ref 130–400)
POTASSIUM SERPL-SCNC: 3.9 MMOL/L (ref 3.5–5.1)
RBC # BLD AUTO: 2.45 MILL/UL (ref 4.7–6.1)
SODIUM SERPL-SCNC: 137 MMOL/L (ref 136–145)
VANCOMYCIN SERPL-MCNC: 17.2 UG/ML
WBC # BLD AUTO: 19.2 10X3/UL (ref 4.8–10.8)

## 2023-09-30 RX ADMIN — HYDROCODONE BITARTRATE AND ACETAMINOPHEN PRN TAB: 5; 325 TABLET ORAL at 23:28

## 2023-09-30 RX ADMIN — HYDROCODONE BITARTRATE AND ACETAMINOPHEN PRN TAB: 5; 325 TABLET ORAL at 17:53

## 2023-10-01 LAB
ANION GAP SERPL CALC-SCNC: 14 MMOL/L (ref 10–20)
BASOPHILS # BLD AUTO: 0 THOU/UL (ref 0–0.2)
BASOPHILS NFR BLD AUTO: 0.1 % (ref 0–1)
BUN SERPL-MCNC: 52 MG/DL (ref 8.4–25.7)
CALCIUM SERPL-MCNC: 7.2 MG/DL (ref 7.8–10.44)
CHLORIDE SERPL-SCNC: 97 MMOL/L (ref 98–107)
CO2 SERPL-SCNC: 24 MMOL/L (ref 23–31)
CREAT CL PREDICTED SERPL C-G-VRATE: 7 ML/MIN (ref 70–130)
EOSINOPHIL # BLD AUTO: 0.2 THOU/UL (ref 0–0.7)
EOSINOPHIL NFR BLD AUTO: 1 % (ref 0–10)
GLUCOSE SERPL-MCNC: 99 MG/DL (ref 80–115)
HCT VFR BLD CALC: 21.7 % (ref 42–52)
HGB BLD-MCNC: 6.9 G/DL (ref 14–18)
LYMPHOCYTES NFR BLD AUTO: 4.2 % (ref 21–51)
MCH RBC QN AUTO: 30.4 PG (ref 27–31)
MCV RBC AUTO: 95.6 FL (ref 78–98)
MONOCYTES # BLD AUTO: 1.6 THOU/UL (ref 0.11–0.59)
MONOCYTES NFR BLD AUTO: 8.9 % (ref 0–10)
NEUTROPHILS # BLD AUTO: 15.4 THOU/UL (ref 1.4–6.5)
NEUTROPHILS NFR BLD AUTO: 85.1 % (ref 42–75)
PLATELET # BLD AUTO: 125 10X3/UL (ref 130–400)
POTASSIUM SERPL-SCNC: 3.9 MMOL/L (ref 3.5–5.1)
RBC # BLD AUTO: 2.27 MILL/UL (ref 4.7–6.1)
SODIUM SERPL-SCNC: 131 MMOL/L (ref 136–145)
VANCOMYCIN SERPL-MCNC: 22.2 UG/ML
WBC # BLD AUTO: 18.1 10X3/UL (ref 4.8–10.8)

## 2023-10-01 RX ADMIN — HYDROCODONE BITARTRATE AND ACETAMINOPHEN PRN TAB: 5; 325 TABLET ORAL at 03:09

## 2023-10-01 RX ADMIN — HYDROCODONE BITARTRATE AND ACETAMINOPHEN PRN TAB: 5; 325 TABLET ORAL at 08:49

## 2023-10-01 RX ADMIN — HYDROCODONE BITARTRATE AND ACETAMINOPHEN PRN TAB: 5; 325 TABLET ORAL at 21:19

## 2023-10-02 LAB
ANION GAP SERPL CALC-SCNC: 17 MMOL/L (ref 10–20)
BASOPHILS # BLD AUTO: 0 THOU/UL (ref 0–0.2)
BASOPHILS NFR BLD AUTO: 0.1 % (ref 0–1)
BUN SERPL-MCNC: 69 MG/DL (ref 8.4–25.7)
CALCIUM SERPL-MCNC: 7.1 MG/DL (ref 7.8–10.44)
CHLORIDE SERPL-SCNC: 97 MMOL/L (ref 98–107)
CO2 SERPL-SCNC: 19 MMOL/L (ref 23–31)
CREAT CL PREDICTED SERPL C-G-VRATE: 6 ML/MIN (ref 70–130)
EOSINOPHIL # BLD AUTO: 0.2 THOU/UL (ref 0–0.7)
EOSINOPHIL NFR BLD AUTO: 1.1 % (ref 0–10)
GLUCOSE SERPL-MCNC: 103 MG/DL (ref 80–115)
HCT VFR BLD CALC: 25.4 % (ref 42–52)
HGB BLD-MCNC: 8.4 G/DL (ref 14–18)
LYMPHOCYTES NFR BLD AUTO: 4 % (ref 21–51)
MAGNESIUM SERPL-MCNC: 1.8 MG/DL (ref 1.6–2.6)
MCH RBC QN AUTO: 30.8 PG (ref 27–31)
MCV RBC AUTO: 93 FL (ref 78–98)
MONOCYTES # BLD AUTO: 1.6 THOU/UL (ref 0.11–0.59)
MONOCYTES NFR BLD AUTO: 11.5 % (ref 0–10)
NEUTROPHILS # BLD AUTO: 11.7 THOU/UL (ref 1.4–6.5)
NEUTROPHILS NFR BLD AUTO: 82.5 % (ref 42–75)
PLATELET # BLD AUTO: 117 10X3/UL (ref 130–400)
POTASSIUM SERPL-SCNC: 4.2 MMOL/L (ref 3.5–5.1)
RBC # BLD AUTO: 2.73 MILL/UL (ref 4.7–6.1)
SODIUM SERPL-SCNC: 129 MMOL/L (ref 136–145)
VANCOMYCIN SERPL-MCNC: 19.3 UG/ML
WBC # BLD AUTO: 14.2 10X3/UL (ref 4.8–10.8)

## 2023-10-02 RX ADMIN — ERYTHROPOIETIN SCH UNITS: 10000 INJECTION, SOLUTION INTRAVENOUS; SUBCUTANEOUS at 12:58

## 2023-10-02 RX ADMIN — HYDROCODONE BITARTRATE AND ACETAMINOPHEN PRN TAB: 5; 325 TABLET ORAL at 14:50

## 2023-10-03 LAB
ANION GAP SERPL CALC-SCNC: 14 MMOL/L (ref 10–20)
BASOPHILS # BLD AUTO: 0 THOU/UL (ref 0–0.2)
BASOPHILS NFR BLD AUTO: 0.1 % (ref 0–1)
BUN SERPL-MCNC: 33 MG/DL (ref 8.4–25.7)
CALCIUM SERPL-MCNC: 7.4 MG/DL (ref 7.8–10.44)
CHLORIDE SERPL-SCNC: 99 MMOL/L (ref 98–107)
CO2 SERPL-SCNC: 26 MMOL/L (ref 23–31)
CREAT CL PREDICTED SERPL C-G-VRATE: 9 ML/MIN (ref 70–130)
EOSINOPHIL # BLD AUTO: 0.1 THOU/UL (ref 0–0.7)
EOSINOPHIL NFR BLD AUTO: 1.3 % (ref 0–10)
GLUCOSE SERPL-MCNC: 94 MG/DL (ref 80–115)
HCT VFR BLD CALC: 25.4 % (ref 42–52)
HGB BLD-MCNC: 8.4 G/DL (ref 14–18)
LYMPHOCYTES NFR BLD AUTO: 6.7 % (ref 21–51)
MCH RBC QN AUTO: 30.8 PG (ref 27–31)
MCV RBC AUTO: 93 FL (ref 78–98)
MONOCYTES # BLD AUTO: 1.4 THOU/UL (ref 0.11–0.59)
MONOCYTES NFR BLD AUTO: 14.8 % (ref 0–10)
NEUTROPHILS # BLD AUTO: 7.3 THOU/UL (ref 1.4–6.5)
NEUTROPHILS NFR BLD AUTO: 76.4 % (ref 42–75)
PLATELET # BLD AUTO: 110 10X3/UL (ref 130–400)
POTASSIUM SERPL-SCNC: 3.9 MMOL/L (ref 3.5–5.1)
RBC # BLD AUTO: 2.73 MILL/UL (ref 4.7–6.1)
SODIUM SERPL-SCNC: 135 MMOL/L (ref 136–145)
WBC # BLD AUTO: 9.5 10X3/UL (ref 4.8–10.8)

## 2023-10-03 PROCEDURE — 0JB70ZZ EXCISION OF BACK SUBCUTANEOUS TISSUE AND FASCIA, OPEN APPROACH: ICD-10-PCS | Performed by: SURGERY

## 2023-10-03 RX ADMIN — ASPIRIN SCH MG: 81 TABLET ORAL at 09:50

## 2023-10-03 RX ADMIN — HYDROCODONE BITARTRATE AND ACETAMINOPHEN PRN TAB: 5; 325 TABLET ORAL at 11:59

## 2023-10-04 LAB — VANCOMYCIN SERPL-MCNC: 18.7 UG/ML

## 2023-10-04 RX ADMIN — ASPIRIN SCH MG: 81 TABLET ORAL at 14:02

## 2023-10-04 RX ADMIN — HYDROCODONE BITARTRATE AND ACETAMINOPHEN PRN TAB: 5; 325 TABLET ORAL at 08:46

## 2023-10-04 RX ADMIN — ERYTHROPOIETIN SCH UNITS: 10000 INJECTION, SOLUTION INTRAVENOUS; SUBCUTANEOUS at 08:47

## 2023-10-04 RX ADMIN — HYDROCODONE BITARTRATE AND ACETAMINOPHEN PRN TAB: 5; 325 TABLET ORAL at 14:16

## 2023-10-05 LAB
ANION GAP SERPL CALC-SCNC: 13 MMOL/L (ref 10–20)
ANISOCYTOSIS BLD QL SMEAR: (no result) HPF (ref 0–5)
BUN SERPL-MCNC: 35 MG/DL (ref 8.4–25.7)
CALCIUM SERPL-MCNC: 7.9 MG/DL (ref 7.8–10.44)
CELLAVISION OPERATOR ID: (no result)
CHLORIDE SERPL-SCNC: 98 MMOL/L (ref 98–107)
CO2 SERPL-SCNC: 29 MMOL/L (ref 23–31)
CREAT CL PREDICTED SERPL C-G-VRATE: 10 ML/MIN (ref 70–130)
DELETE AUTO DIFF??: YES
GLUCOSE SERPL-MCNC: 94 MG/DL (ref 80–115)
HCT VFR BLD CALC: 26.3 % (ref 42–52)
HGB BLD-MCNC: 8.3 G/DL (ref 14–18)
MACROCYTES BLD QL SMEAR: (no result) HPF (ref 0–5)
MANUAL DIFF??: YES
MCH RBC QN AUTO: 30.2 PG (ref 27–31)
MCV RBC AUTO: 95.6 FL (ref 78–98)
OVALOCYTES BLD QL SMEAR: (no result) HPF (ref 0–1)
PLATELET # BLD AUTO: 115 10X3/UL (ref 130–400)
POLYCHROMASIA BLD QL SMEAR: (no result) HPF (ref 0–2)
POTASSIUM SERPL-SCNC: 4.5 MMOL/L (ref 3.5–5.1)
RBC # BLD AUTO: 2.75 MILL/UL (ref 4.7–6.1)
SMUDGE CELLS BLD QL SMEAR: 16.2 %
SODIUM SERPL-SCNC: 135 MMOL/L (ref 136–145)
WBC # BLD AUTO: 7.2 10X3/UL (ref 4.8–10.8)

## 2023-10-05 RX ADMIN — HYDROCODONE BITARTRATE AND ACETAMINOPHEN PRN TAB: 5; 325 TABLET ORAL at 15:53

## 2023-10-05 RX ADMIN — HYDROCODONE BITARTRATE AND ACETAMINOPHEN PRN TAB: 5; 325 TABLET ORAL at 20:44

## 2023-10-05 RX ADMIN — HYDROCODONE BITARTRATE AND ACETAMINOPHEN PRN TAB: 5; 325 TABLET ORAL at 08:51

## 2023-10-05 RX ADMIN — ONDANSETRON PRN MG: 2 INJECTION INTRAMUSCULAR; INTRAVENOUS at 18:21

## 2023-10-05 RX ADMIN — ASPIRIN SCH MG: 81 TABLET ORAL at 08:51

## 2023-10-06 LAB
ANION GAP SERPL CALC-SCNC: 14 MMOL/L (ref 10–20)
ANISOCYTOSIS BLD QL SMEAR: (no result) HPF (ref 0–5)
BUN SERPL-MCNC: 55 MG/DL (ref 8.4–25.7)
CALCIUM SERPL-MCNC: 7.8 MG/DL (ref 7.8–10.44)
CELLAVISION OPERATOR ID: (no result)
CHLORIDE SERPL-SCNC: 96 MMOL/L (ref 98–107)
CO2 SERPL-SCNC: 27 MMOL/L (ref 23–31)
CREAT CL PREDICTED SERPL C-G-VRATE: 8 ML/MIN (ref 70–130)
DELETE AUTO DIFF??: YES
GLUCOSE SERPL-MCNC: 87 MG/DL (ref 80–115)
HCT VFR BLD CALC: 26.6 % (ref 42–52)
HGB BLD-MCNC: 8.3 G/DL (ref 14–18)
MAGNESIUM SERPL-MCNC: 2 MG/DL (ref 1.6–2.6)
MANUAL DIFF??: YES
MCH RBC QN AUTO: 30 PG (ref 27–31)
MCV RBC AUTO: 96 FL (ref 78–98)
PLATELET # BLD AUTO: 127 10X3/UL (ref 130–400)
POLYCHROMASIA BLD QL SMEAR: (no result) HPF (ref 0–2)
POTASSIUM SERPL-SCNC: 5 MMOL/L (ref 3.5–5.1)
RBC # BLD AUTO: 2.77 MILL/UL (ref 4.7–6.1)
SMUDGE CELLS BLD QL SMEAR: 12.4 %
SODIUM SERPL-SCNC: 132 MMOL/L (ref 136–145)
WBC # BLD AUTO: 7.5 10X3/UL (ref 4.8–10.8)

## 2023-10-06 RX ADMIN — ASPIRIN SCH MG: 81 TABLET ORAL at 09:30

## 2023-10-06 RX ADMIN — HYDROCODONE BITARTRATE AND ACETAMINOPHEN PRN TAB: 5; 325 TABLET ORAL at 20:20

## 2023-10-06 RX ADMIN — HYDROCODONE BITARTRATE AND ACETAMINOPHEN PRN TAB: 5; 325 TABLET ORAL at 04:32

## 2023-10-06 RX ADMIN — HYDROCODONE BITARTRATE AND ACETAMINOPHEN PRN TAB: 5; 325 TABLET ORAL at 09:35

## 2023-10-06 RX ADMIN — ERYTHROPOIETIN SCH UNITS: 10000 INJECTION, SOLUTION INTRAVENOUS; SUBCUTANEOUS at 09:30

## 2023-10-07 LAB
BASOPHILS # BLD AUTO: 0 THOU/UL (ref 0–0.2)
BASOPHILS NFR BLD AUTO: 0.2 % (ref 0–1)
EOSINOPHIL # BLD AUTO: 0.2 THOU/UL (ref 0–0.7)
EOSINOPHIL NFR BLD AUTO: 2.6 % (ref 0–10)
HCT VFR BLD CALC: 25.6 % (ref 42–52)
HGB BLD-MCNC: 8.2 G/DL (ref 14–18)
LYMPHOCYTES NFR BLD AUTO: 11.6 % (ref 21–51)
MCH RBC QN AUTO: 31.3 PG (ref 27–31)
MCV RBC AUTO: 97.7 FL (ref 78–98)
MONOCYTES # BLD AUTO: 1.1 THOU/UL (ref 0.11–0.59)
MONOCYTES NFR BLD AUTO: 19.2 % (ref 0–10)
NEUTROPHILS # BLD AUTO: 3.8 THOU/UL (ref 1.4–6.5)
NEUTROPHILS NFR BLD AUTO: 65.9 % (ref 42–75)
PLATELET # BLD AUTO: 122 10X3/UL (ref 130–400)
RBC # BLD AUTO: 2.62 MILL/UL (ref 4.7–6.1)
WBC # BLD AUTO: 5.8 10X3/UL (ref 4.8–10.8)

## 2023-10-07 RX ADMIN — HYDROCODONE BITARTRATE AND ACETAMINOPHEN PRN TAB: 5; 325 TABLET ORAL at 22:02

## 2023-10-07 RX ADMIN — ASPIRIN SCH MG: 81 TABLET ORAL at 11:17

## 2023-10-08 LAB
BASOPHILS # BLD AUTO: 0 THOU/UL (ref 0–0.2)
BASOPHILS NFR BLD AUTO: 0.3 % (ref 0–1)
EOSINOPHIL # BLD AUTO: 0.2 THOU/UL (ref 0–0.7)
EOSINOPHIL NFR BLD AUTO: 2.6 % (ref 0–10)
HCT VFR BLD CALC: 28 % (ref 42–52)
HGB BLD-MCNC: 8.8 G/DL (ref 14–18)
LYMPHOCYTES NFR BLD AUTO: 10.8 % (ref 21–51)
MCH RBC QN AUTO: 30.6 PG (ref 27–31)
MCV RBC AUTO: 97.2 FL (ref 78–98)
MONOCYTES # BLD AUTO: 1.1 THOU/UL (ref 0.11–0.59)
MONOCYTES NFR BLD AUTO: 15.4 % (ref 0–10)
NEUTROPHILS # BLD AUTO: 5.1 THOU/UL (ref 1.4–6.5)
NEUTROPHILS NFR BLD AUTO: 70.5 % (ref 42–75)
PLATELET # BLD AUTO: 150 10X3/UL (ref 130–400)
RBC # BLD AUTO: 2.88 MILL/UL (ref 4.7–6.1)
WBC # BLD AUTO: 7.3 10X3/UL (ref 4.8–10.8)

## 2023-10-08 RX ADMIN — HYDROCODONE BITARTRATE AND ACETAMINOPHEN PRN TAB: 5; 325 TABLET ORAL at 12:57

## 2023-10-08 RX ADMIN — HYDROCODONE BITARTRATE AND ACETAMINOPHEN PRN TAB: 5; 325 TABLET ORAL at 21:29

## 2023-10-08 RX ADMIN — ASPIRIN SCH MG: 81 TABLET ORAL at 08:58

## 2023-10-09 LAB
ANION GAP SERPL CALC-SCNC: 16 MMOL/L (ref 10–20)
BASOPHILS # BLD AUTO: 0 THOU/UL (ref 0–0.2)
BASOPHILS NFR BLD AUTO: 0.3 % (ref 0–1)
BUN SERPL-MCNC: 69 MG/DL (ref 8.4–25.7)
CALCIUM SERPL-MCNC: 8.4 MG/DL (ref 7.8–10.44)
CHLORIDE SERPL-SCNC: 102 MMOL/L (ref 98–107)
CO2 SERPL-SCNC: 23 MMOL/L (ref 23–31)
CREAT CL PREDICTED SERPL C-G-VRATE: 6 ML/MIN (ref 70–130)
EOSINOPHIL # BLD AUTO: 0.3 THOU/UL (ref 0–0.7)
EOSINOPHIL NFR BLD AUTO: 3.9 % (ref 0–10)
GLUCOSE SERPL-MCNC: 90 MG/DL (ref 80–115)
HCT VFR BLD CALC: 27.3 % (ref 42–52)
HGB BLD-MCNC: 8.7 G/DL (ref 14–18)
LYMPHOCYTES NFR BLD AUTO: 9.4 % (ref 21–51)
MCH RBC QN AUTO: 31.3 PG (ref 27–31)
MCV RBC AUTO: 98.2 FL (ref 78–98)
MONOCYTES # BLD AUTO: 0.8 THOU/UL (ref 0.11–0.59)
MONOCYTES NFR BLD AUTO: 10.2 % (ref 0–10)
NEUTROPHILS # BLD AUTO: 5.7 THOU/UL (ref 1.4–6.5)
NEUTROPHILS NFR BLD AUTO: 75.8 % (ref 42–75)
PLATELET # BLD AUTO: 159 10X3/UL (ref 130–400)
POTASSIUM SERPL-SCNC: 5.1 MMOL/L (ref 3.5–5.1)
RBC # BLD AUTO: 2.78 MILL/UL (ref 4.7–6.1)
SODIUM SERPL-SCNC: 136 MMOL/L (ref 136–145)
WBC # BLD AUTO: 7.5 10X3/UL (ref 4.8–10.8)

## 2023-10-09 RX ADMIN — ASPIRIN SCH MG: 81 TABLET ORAL at 12:17

## 2023-10-09 RX ADMIN — EPOETIN ALFA-EPBX SCH UNITS: 10000 INJECTION, SOLUTION INTRAVENOUS; SUBCUTANEOUS at 12:17

## 2023-10-09 RX ADMIN — HYDROCODONE BITARTRATE AND ACETAMINOPHEN PRN TAB: 5; 325 TABLET ORAL at 23:40

## 2023-10-09 RX ADMIN — HYDROCODONE BITARTRATE AND ACETAMINOPHEN PRN TAB: 5; 325 TABLET ORAL at 18:33

## 2023-10-10 RX ADMIN — ASPIRIN SCH MG: 81 TABLET ORAL at 08:36

## 2023-10-11 VITALS — DIASTOLIC BLOOD PRESSURE: 84 MMHG | TEMPERATURE: 98.3 F | SYSTOLIC BLOOD PRESSURE: 140 MMHG

## 2023-10-11 RX ADMIN — EPOETIN ALFA-EPBX SCH: 10000 INJECTION, SOLUTION INTRAVENOUS; SUBCUTANEOUS at 13:19

## 2023-10-11 RX ADMIN — ASPIRIN SCH MG: 81 TABLET ORAL at 13:18

## 2023-10-11 RX ADMIN — HYDROCODONE BITARTRATE AND ACETAMINOPHEN PRN TAB: 5; 325 TABLET ORAL at 16:11
